# Patient Record
Sex: MALE | Race: WHITE | Employment: FULL TIME | URBAN - METROPOLITAN AREA
[De-identification: names, ages, dates, MRNs, and addresses within clinical notes are randomized per-mention and may not be internally consistent; named-entity substitution may affect disease eponyms.]

---

## 2021-06-21 ENCOUNTER — HOSPITAL ENCOUNTER (EMERGENCY)
Facility: HOSPITAL | Age: 51
Discharge: HOME | End: 2021-06-21
Payer: COMMERCIAL

## 2021-06-21 VITALS
HEIGHT: 68 IN | RESPIRATION RATE: 18 BRPM | HEART RATE: 67 BPM | TEMPERATURE: 98 F | WEIGHT: 200 LBS | SYSTOLIC BLOOD PRESSURE: 131 MMHG | DIASTOLIC BLOOD PRESSURE: 81 MMHG | BODY MASS INDEX: 30.31 KG/M2 | OXYGEN SATURATION: 97 %

## 2021-06-21 DIAGNOSIS — S61.212A LACERATION OF RIGHT MIDDLE FINGER WITHOUT FOREIGN BODY WITHOUT DAMAGE TO NAIL, INITIAL ENCOUNTER: Primary | ICD-10-CM

## 2021-06-21 PROCEDURE — 12002 RPR S/N/AX/GEN/TRNK2.6-7.5CM: CPT

## 2021-06-21 PROCEDURE — 99283 EMERGENCY DEPT VISIT LOW MDM: CPT

## 2021-06-21 PROCEDURE — 90471 IMMUNIZATION ADMIN: CPT

## 2021-06-22 NOTE — ED PROVIDER NOTES
Patient Seen in: [facility] AND [facility] Emergency Department      History   Patient presents with:  Laceration/Abrasion    Stated Complaint: Laceration    HPI/Subjective:   51yo/m with hx of DM, HLD reports to the ED With complaints of right middle finger lac Breath sounds: Normal breath sounds. Abdominal:      General: Bowel sounds are normal.      Palpations: Abdomen is soft. Musculoskeletal:         General: Tenderness and signs of injury present. No deformity. Normal range of motion.       Cervical back: medications for this patient.

## 2024-08-22 ENCOUNTER — HOSPITAL ENCOUNTER (EMERGENCY)
Facility: HOSPITAL | Age: 54
Discharge: HOME | End: 2024-08-23
Attending: EMERGENCY MEDICINE
Payer: OTHER MISCELLANEOUS

## 2024-08-22 ENCOUNTER — APPOINTMENT (OUTPATIENT)
Dept: GENERAL RADIOLOGY | Facility: HOSPITAL | Age: 54
End: 2024-08-22
Attending: EMERGENCY MEDICINE
Payer: OTHER MISCELLANEOUS

## 2024-08-22 DIAGNOSIS — S60.10XA SUBUNGUAL HEMATOMA OF FINGERNAIL, INITIAL ENCOUNTER: Primary | ICD-10-CM

## 2024-08-22 PROCEDURE — 73140 X-RAY EXAM OF FINGER(S): CPT | Performed by: EMERGENCY MEDICINE

## 2024-08-22 PROCEDURE — 96372 THER/PROPH/DIAG INJ SC/IM: CPT

## 2024-08-22 PROCEDURE — 99283 EMERGENCY DEPT VISIT LOW MDM: CPT

## 2024-08-22 PROCEDURE — 99284 EMERGENCY DEPT VISIT MOD MDM: CPT

## 2024-08-22 RX ORDER — FAMOTIDINE 20 MG/1
20 TABLET, FILM COATED ORAL 2 TIMES DAILY PRN
COMMUNITY

## 2024-08-22 RX ORDER — MELATONIN
1000 DAILY
COMMUNITY

## 2024-08-22 RX ORDER — KETOROLAC TROMETHAMINE 15 MG/ML
15 INJECTION, SOLUTION INTRAMUSCULAR; INTRAVENOUS ONCE
Status: COMPLETED | OUTPATIENT
Start: 2024-08-22 | End: 2024-08-23

## 2024-08-22 RX ORDER — MULTIVIT-MIN/IRON FUM/FOLIC AC 7.5 MG-4
1 TABLET ORAL DAILY
COMMUNITY

## 2024-08-22 RX ORDER — EZETIMIBE 10 MG/1
10 TABLET ORAL NIGHTLY
COMMUNITY

## 2024-08-22 RX ORDER — ROSUVASTATIN CALCIUM 20 MG/1
20 TABLET, COATED ORAL NIGHTLY
COMMUNITY

## 2024-08-22 RX ORDER — LEVOCETIRIZINE DIHYDROCHLORIDE 5 MG/1
5 TABLET, FILM COATED ORAL EVERY EVENING
COMMUNITY

## 2024-08-23 ENCOUNTER — APPOINTMENT (OUTPATIENT)
Dept: OCCUPATIONAL MEDICINE | Age: 54
End: 2024-08-23
Attending: NURSE PRACTITIONER
Payer: COMMERCIAL

## 2024-08-23 VITALS
OXYGEN SATURATION: 99 % | WEIGHT: 209 LBS | HEIGHT: 68 IN | RESPIRATION RATE: 20 BRPM | TEMPERATURE: 98 F | SYSTOLIC BLOOD PRESSURE: 127 MMHG | DIASTOLIC BLOOD PRESSURE: 75 MMHG | BODY MASS INDEX: 31.67 KG/M2 | HEART RATE: 55 BPM

## 2024-08-23 NOTE — DISCHARGE INSTRUCTIONS
--Return for worsening symptoms or any other concerns as we discussed including the following but not limited to: Worsening pain, change in strength or sensation, redness or rash, swelling  --Rest, instructions for home care as discussed  --Okay to take 650 mg Tylenol and 600 mg ibuprofen for pain symptoms.  Okay to alternate dosing every 3 hours.  An example schedule is as follows: 9 AM Tylenol, noon ibuprofen, 3 PM Tylenol, 6 PM ibuprofen, 9 PM Tylenol

## 2024-08-23 NOTE — ED PROVIDER NOTES
Patient Seen in: [facility] Emergency Department      History     Chief Complaint   Patient presents with    Arm or Hand Injury     Stated Complaint: Rt thumb injury    Subjective:   HPI    54-year-old male with past medical history notable for anemia, hyperlipidemia and diabetes presenting to the emergency department for right thumb injury.    Patient states that right thumb was injured while at work earlier today.  States that he was getting out of a car with his right hand placed behind his back as he was shutting a car door when the right thumb got trapped.  Patient noted immediate pain to the area with no color changes proximally.  Patient states that he took Tylenol and Aleve for symptoms with minimal relief of symptoms.  Patient states that the pain does not radiate from of the thumb proximally.  Does not decently specific strength or sensation changes.  Denies redness or rash.  Denies fever.  States that he did not fall during this event and did not have any head strike or loss of consciousness.      Of note patient is left-handed.    Objective:   Past Medical History:    Diabetes (HCC)    Hyperlipidemia              Past Surgical History:   Procedure Laterality Date    Anterior cruciate ligament repair Left                 Social History     Socioeconomic History    Marital status:    Tobacco Use    Smoking status: Never    Smokeless tobacco: Never   Vaping Use    Vaping status: Never Used   Substance and Sexual Activity    Alcohol use: Yes     Comment: occassionally    Drug use: Never              Review of Systems    Positive for stated Chief Complaint: Arm or Hand Injury    Other systems are as noted in HPI.  Constitutional and vital signs reviewed.      All other systems reviewed and negative except as noted above.    Physical Exam     ED Triage Vitals   BP 08/22/24 2306 138/80   Pulse 08/22/24 2306 54   Resp 08/22/24 2306 18   Temp 08/22/24 2306 98.3 °F (36.8 °C)   Temp src 08/22/24  2306 Oral   SpO2 08/22/24 2306 98 %   O2 Device 08/23/24 0038 None (Room air)       Current Vitals:   Vital Signs  BP: 127/75  Pulse: 55  Resp: 20  Temp: 98.3 °F (36.8 °C)  Temp src: Oral    Oxygen Therapy  SpO2: 99 %  O2 Device: None (Room air)            Physical Exam    Physical Exam:   /75   Pulse 55   Temp 98.3 °F (36.8 °C) (Oral)   Resp 20   Ht 172.7 cm (5' 8\")   Wt 94.8 kg   SpO2 99%   BMI 31.78 kg/m²  - I reviewed these vital signs    Constitutional: Pt is well appearing, in no distress  HEENT: Normocephalic/Atraumatic, EOM grossly normal, Conjunctiva Clear  Neck: ROM intact  Lungs: Talking in full sentences in no respiratory distress  Cardiovascular: RR: yes, equal pulses to upper extremities bilaterally  Musculoskeletal: No gross deformities noted, No cyanosis/clubbing noted, tenderness to palpation distal aspect of the first digit, right first digit subungual hematoma of approximately 30% of nail, no bleeding noted, no open wounds noted, no nail deformation noted, soft compartments, anatomical snuffbox pain not noted, flexion and extension functions of all digits intact at PIP and DIP, oppositional capabilities of thumb to digits 2 through 5 intact, sensation intact and equal bilaterally, cap refill less than 1 second, scissoring of digits noted normally, no obvious ligamentous abnormality noted  Neurologic: A&O x 3, Speech clear, No facial asymmetry noted, Equal strength and sensation in extremities appreciated  Psychiatric: Mood and affect are appropriate, Speech not pressured, Thought process is logical  Skin: Warm and dry, No rash      ED Course   Labs Reviewed - No data to display       MDM          Medical Decision Making  54-year-old male presenting to the emergency department for evaluation of right thumb injury.    On arrival to the emergency department patient overall well-appearing.  Nontoxic in appearance.    Given Toradol for pain.    Pulses intact with normal capillary refill  and no changes in sensation lessening concern for acute neurovascular injury.  No obvious ligamentous laxity lessening concern for ligamentous injury.  No erythema or fever noted lessening concern for cellulitis versus acute infection.    ED Course as of 08/23/24 0313  ------------------------------------------------------------  Time: 08/23 0014  Comment: X-ray read and interpreted me showing no evidence of acute fracture.  Radiology read agreeable noting no acute fracture or malalignment.     Do not suspect acute fracture.  Nailbed intact lessening concern for nailbed laceration.  Suspect uncomplicated subungual hematoma as a cause for the patient's presentation.    Shared decision making used with decision to avoid trephination at this time.  Subungual hematoma less than 50% with tolerable pain.    Patient will be discharged with proper follow-up instructions and return precautions.  Given appropriate pain medication schedule.    Amount and/or Complexity of Data Reviewed  Independent Historian: spouse  Radiology: ordered and independent interpretation performed. Decision-making details documented in ED Course.    Risk  Prescription drug management.        Disposition and Plan     Clinical Impression:  1. Subungual hematoma of fingernail, initial encounter         Disposition:  Discharge  8/23/2024 12:28 am    Follow-up:  [address] [place] [address] [phone]    Schedule an appointment as soon as possible for a visit in 1 week(s)  For follow up    [facility] Emergency Department  [address] [facility] [id number] [phone]  Follow up  As needed, If symptoms worsen          Medications Prescribed:  Discharge Medication List as of 8/23/2024 12:39 AM

## 2024-08-23 NOTE — ED INITIAL ASSESSMENT (HPI)
Pt presents with c/o pain to right hand thumb after getting it slammed in a truck door around 10am today.

## 2024-08-26 ENCOUNTER — APPOINTMENT (OUTPATIENT)
Dept: OCCUPATIONAL MEDICINE | Age: 54
End: 2024-08-26
Attending: NURSE PRACTITIONER

## 2024-08-28 ENCOUNTER — APPOINTMENT (OUTPATIENT)
Dept: OCCUPATIONAL MEDICINE | Age: 54
End: 2024-08-28
Attending: NURSE PRACTITIONER

## 2025-05-17 ENCOUNTER — HOSPITAL ENCOUNTER (EMERGENCY)
Age: 55
Discharge: HOME | End: 2025-05-17
Attending: EMERGENCY MEDICINE

## 2025-05-17 ENCOUNTER — APPOINTMENT (OUTPATIENT)
Dept: GENERAL RADIOLOGY | Age: 55
End: 2025-05-17
Attending: EMERGENCY MEDICINE

## 2025-05-17 VITALS
DIASTOLIC BLOOD PRESSURE: 73 MMHG | TEMPERATURE: 98.4 F | RESPIRATION RATE: 18 BRPM | HEART RATE: 56 BPM | WEIGHT: 201.28 LBS | SYSTOLIC BLOOD PRESSURE: 119 MMHG | OXYGEN SATURATION: 99 %

## 2025-05-17 DIAGNOSIS — S90.32XA CONTUSION OF LEFT FOOT, INITIAL ENCOUNTER: Primary | ICD-10-CM

## 2025-05-17 PROCEDURE — 73630 X-RAY EXAM OF FOOT: CPT

## 2025-05-17 PROCEDURE — 99282 EMERGENCY DEPT VISIT SF MDM: CPT | Performed by: EMERGENCY MEDICINE

## 2025-05-17 RX ORDER — ROSUVASTATIN CALCIUM 20 MG/1
1 TABLET, COATED ORAL NIGHTLY
COMMUNITY
Start: 2025-02-17

## 2025-05-17 RX ORDER — EZETIMIBE 10 MG/1
10 TABLET ORAL DAILY
COMMUNITY

## 2025-05-17 RX ORDER — EMPAGLIFLOZIN 25 MG/1
25 TABLET, FILM COATED ORAL DAILY
COMMUNITY

## 2025-05-17 ASSESSMENT — PAIN SCALES - GENERAL: PAINLEVEL_OUTOF10: 0

## 2025-05-19 ENCOUNTER — CASE MANAGEMENT (OUTPATIENT)
Dept: OCCUPATIONAL MEDICINE | Age: 55
End: 2025-05-19